# Patient Record
Sex: MALE | Race: WHITE | NOT HISPANIC OR LATINO | Employment: FULL TIME | URBAN - METROPOLITAN AREA
[De-identification: names, ages, dates, MRNs, and addresses within clinical notes are randomized per-mention and may not be internally consistent; named-entity substitution may affect disease eponyms.]

---

## 2024-12-02 ENCOUNTER — OFFICE VISIT (OUTPATIENT)
Dept: UROLOGY | Facility: HOSPITAL | Age: 34
End: 2024-12-02
Payer: COMMERCIAL

## 2024-12-02 DIAGNOSIS — Z31.69 INFERTILITY COUNSELING: Primary | ICD-10-CM

## 2024-12-02 PROCEDURE — 99203 OFFICE O/P NEW LOW 30 MIN: CPT | Performed by: UROLOGY

## 2024-12-02 PROCEDURE — 99213 OFFICE O/P EST LOW 20 MIN: CPT | Performed by: UROLOGY

## 2024-12-02 PROCEDURE — 1036F TOBACCO NON-USER: CPT | Performed by: UROLOGY

## 2024-12-02 ASSESSMENT — PATIENT HEALTH QUESTIONNAIRE - PHQ9
1. LITTLE INTEREST OR PLEASURE IN DOING THINGS: NOT AT ALL
SUM OF ALL RESPONSES TO PHQ9 QUESTIONS 1 AND 2: 0
2. FEELING DOWN, DEPRESSED OR HOPELESS: NOT AT ALL

## 2024-12-02 NOTE — PROGRESS NOTES
HPI:  34 y.o. yo male  referred to me by   self referred for infertility, wife seen another MD 2-3 weeks ago.    ED notes reviewed, [facility] med notes reviewed    Partner/wife name: Kamla Pritchett  Partner/wife  : 3/31/1990  Attempting Pregnancy for :  1 year  Previous pregnancies for either partner: no    Previous Semen analysis / Labs: no      PREVIOUS RISK FACTORS  History of testicular exposure to chemicals, radiation, or toxins: None    History of high fever, epididymitis, orchitis, prostatitis, sexually transmitted diseases, or trauma to the testicles: None    History of a varicocele, testicular torsion, cryptorchidism, postpubertile mumps or a family history of infertility: No    Coital Frequency: tracking ovulation and all the time  Coital Lubricants:   Problems with erections or ejaculation: No  Smoker: No  Previous Testosterone or anabolic steroid Use: No      PRIOR Assisted Reproductive Technology:  None      PMH:  No past medical history on file.     PSH:  No past surgical history on file.     Medications:  No current outpatient medications on file.    Allergy:  No Known Allergies     Exam  Testicles descended bilaterally, nontender, no masses  Vasa palpable bilaterally  Penis circ'd, no lesions, no plaques  Cyst in left epididymis    Assessment/Plan  #infertility  I counseled the patient in detail regarding infertility, specifically the male component. We reviewed different causes of male infertility as well as options to optimize male fertility, to different surgical interventions and assisted reproductive technologies.    Semen analysis with strict morphology x 2.      Fu after (can be virtual)    Scribe Attestation  By signing my name below, ISandrita , Scribe   attest that this documentation has been prepared under the direction and in the presence of Vicki Gibson MD.

## 2025-01-31 ENCOUNTER — ANCILLARY PROCEDURE (OUTPATIENT)
Dept: ENDOCRINOLOGY | Facility: CLINIC | Age: 35
End: 2025-01-31
Payer: COMMERCIAL

## 2025-01-31 ENCOUNTER — OFFICE VISIT (OUTPATIENT)
Dept: URGENT CARE | Age: 35
End: 2025-01-31

## 2025-01-31 VITALS
RESPIRATION RATE: 17 BRPM | TEMPERATURE: 98 F | OXYGEN SATURATION: 98 % | WEIGHT: 280 LBS | HEART RATE: 73 BPM | HEIGHT: 75 IN | BODY MASS INDEX: 34.82 KG/M2 | DIASTOLIC BLOOD PRESSURE: 83 MMHG | SYSTOLIC BLOOD PRESSURE: 137 MMHG

## 2025-01-31 DIAGNOSIS — N34.1 NONSPECIFIC URETHRITIS: Primary | ICD-10-CM

## 2025-01-31 DIAGNOSIS — Z31.69 INFERTILITY COUNSELING: ICD-10-CM

## 2025-01-31 LAB
% EX RESIDUAL CYTOPLASM (SEMEN): 0.3 %
% HEAD DEFECTS (SEMEN): 97.3 %
% NECK MIDPIECE (SEMEN): 47 %
% NORMAL (SEMEN): 1.8 % (ref 4–?)
% TAIL DEFECTS (SEMEN): 8.8 %
ABSTINENCE (DAYS): 3 DAYS (ref 2–7)
AGGLUTINATION (SEMEN): NO
ANALYZED TIME:: ABNORMAL
ANDROLOGY LAB ID#: ABNORMAL
CLUMPS (SEMEN): NO
COLLECTED COMPLETELY: YES
COLLECTION LOCATION:: ABNORMAL
COLLECTION METHOD:: ABNORMAL
CONCENTRATION(SEMEN): 41.37 MILL/ML (ref 15–?)
DEBRIS (SEMEN): YES
NON PROG. MOTILITY (SEMEN): 9 %
PROG. MOTILITY (SEMEN): 54 % (ref 32–?)
RECEIVED TIME:: ABNORMAL
SEMEN APPEARANCE: NORMAL
SEMEN LIQUEFACTION: NORMAL
SEMEN VISCOSITY: NORMAL
TOT. NO OF NORM. MOTILE SPERM (SEMEN): 2.29 MILL
TOT. NO OF NORM. SPERM (SEMEN): 3.62 MILL
TOTAL MOTILITY (SEMEN): 63 % (ref 40–?)
TOTAL NO OF MOTILE (SEMEN): 130.61 MILL
TOTAL NO OF SPERM (SEMEN): 206.84 MILL (ref 39–?)
VOLUME (SEMEN): 5 ML (ref 1.5–?)

## 2025-01-31 PROCEDURE — 89322 SEMEN ANAL STRICT CRITERIA: CPT | Performed by: OBSTETRICS & GYNECOLOGY

## 2025-01-31 RX ORDER — AZITHROMYCIN 250 MG/1
TABLET, FILM COATED ORAL
Qty: 6 TABLET | Refills: 0 | Status: SHIPPED | OUTPATIENT
Start: 2025-01-31 | End: 2025-02-05

## 2025-01-31 NOTE — PROGRESS NOTES
"Subjective   Patient ID: Uri Loera is a 34 y.o. male. They present today with a chief complaint of Other.    History of Present Illness  HPI  Patient presents with discussion of receiving azithromycin after his wife tested positive for Ureaplasma.  He states they are seeing an infertility expert who felt there may be a possibility it was contributing to their infertility.  Patient tested positive and was treated with azithromycin (followed by a course of doxycycline).  This patient is asymptomatic with no complaint of UTI symptoms or discharge.  He is feeling well and denies fevers, abdominal pain, cough or URI symptoms.  No testing or urine cultures are planned at this visit.  Past Medical History  Allergies as of 01/31/2025    (No Known Allergies)       (Not in a hospital admission)       No past medical history on file.    No past surgical history on file.     reports that he has never smoked. He has never been exposed to tobacco smoke. He has never used smokeless tobacco. He reports current alcohol use of about 2.0 standard drinks of alcohol per week. He reports that he does not use drugs.    Review of Systems  Review of Systems     As in history of present illness                          Objective    Vitals:    01/31/25 1422   BP: 137/83   Pulse: 73   Resp: 17   Temp: 36.7 °C (98 °F)   SpO2: 98%   Weight: 127 kg (280 lb)   Height: 1.905 m (6' 3\")     No LMP for male patient.    Physical Exam  Alert and oriented, no apparent distress  Abdomen soft, nontender, nondistended.  No CVA tenderness  Skin shows no rashes sores or lesions   exam not indicated at this time  Procedures    Point of Care Test & Imaging Results from this visit  No results found for this visit on 01/31/25.   No results found.    Diagnostic study results (if any) were reviewed by Tino Santiago MD.    Assessment/Plan   Allergies, medications, history, and pertinent labs/EKGs/Imaging reviewed by Tino Santiago MD.     Medical Decision " Making  At time of discharge patient was clinically well-appearing and HDS for outpatient management. The patient and/or family was educated regarding diagnosis, supportive care, OTC and Rx medications. The patient and/or family was given the opportunity to ask questions prior to discharge.  They verbalized understanding of my discussion of the plans for treatment, expected course, indications to return to  or seek further evaluation in ED, and the need for timely follow up as directed.   They were provided with a work/school excuse if requested.    Orders and Diagnoses  Diagnoses and all orders for this visit:  Nonspecific urethritis  -     azithromycin (Zithromax) 250 mg tablet; Take 2 tabs (500 mg) by mouth today, than 1 daily for 4 days.      Medical Admin Record      Patient disposition: Home    Electronically signed by Tino Santiago MD  2:41 PM

## 2025-02-26 ENCOUNTER — ANCILLARY PROCEDURE (OUTPATIENT)
Dept: ENDOCRINOLOGY | Facility: CLINIC | Age: 35
End: 2025-02-26
Payer: COMMERCIAL

## 2025-02-26 DIAGNOSIS — Z31.69 INFERTILITY COUNSELING: ICD-10-CM

## 2025-02-26 LAB
ABSTINENCE (DAYS): 2 DAYS (ref 2–7)
AGGLUTINATION (SEMEN): NO
ANALYZED TIME:: NORMAL
ANDROLOGY LAB ID#: NORMAL
CLUMPS (SEMEN): NO
COLLECTED COMPLETELY: YES
COLLECTION LOCATION:: NORMAL
COLLECTION METHOD:: NORMAL
CONCENTRATION(SEMEN): 51.79 MILL/ML (ref 15–?)
DEBRIS (SEMEN): YES
LEUKOCYTE (SEMEN): NORMAL
NON PROG. MOTILITY (SEMEN): 5 %
PROG. MOTILITY (SEMEN): 62 % (ref 32–?)
RECEIVED TIME:: NORMAL
REI PARTNER DOB: NORMAL
REI PARTNER NAME: NORMAL
SEMEN APPEARANCE: NORMAL
SEMEN LIQUEFACTION: NORMAL
SEMEN VISCOSITY: NORMAL
TOTAL MOTILITY (SEMEN): 67 % (ref 40–?)
TOTAL NO OF MOTILE (SEMEN): 142.41 MILL
TOTAL NO OF RND CELLS (SEMEN): 0.8 MILL (ref ?–5)
TOTAL NO OF SPERM (SEMEN): 212.36 MILL (ref 39–?)
VOLUME (SEMEN): 4.1 ML (ref 1.5–?)

## 2025-02-26 PROCEDURE — 89321 SEMEN ANAL SPERM DETECTION: CPT | Performed by: OBSTETRICS & GYNECOLOGY

## 2025-03-04 ENCOUNTER — CONSULT (OUTPATIENT)
Dept: ENDOCRINOLOGY | Facility: CLINIC | Age: 35
End: 2025-03-04
Payer: COMMERCIAL

## 2025-03-04 VITALS
DIASTOLIC BLOOD PRESSURE: 84 MMHG | HEART RATE: 79 BPM | HEIGHT: 75 IN | BODY MASS INDEX: 34.82 KG/M2 | SYSTOLIC BLOOD PRESSURE: 132 MMHG | TEMPERATURE: 98.1 F | WEIGHT: 280 LBS

## 2025-03-04 DIAGNOSIS — Z11.3 ENCOUNTER FOR SCREENING EXAMINATION FOR SEXUALLY TRANSMITTED DISEASE: ICD-10-CM

## 2025-03-04 DIAGNOSIS — Z31.41 FERTILITY TESTING: Primary | ICD-10-CM

## 2025-03-04 PROCEDURE — 99212 OFFICE O/P EST SF 10 MIN: CPT

## 2025-03-04 PROCEDURE — 99202 OFFICE O/P NEW SF 15 MIN: CPT

## 2025-03-04 ASSESSMENT — COLUMBIA-SUICIDE SEVERITY RATING SCALE - C-SSRS
6. HAVE YOU EVER DONE ANYTHING, STARTED TO DO ANYTHING, OR PREPARED TO DO ANYTHING TO END YOUR LIFE?: NO
2. HAVE YOU ACTUALLY HAD ANY THOUGHTS OF KILLING YOURSELF?: NO
1. IN THE PAST MONTH, HAVE YOU WISHED YOU WERE DEAD OR WISHED YOU COULD GO TO SLEEP AND NOT WAKE UP?: NO

## 2025-03-04 ASSESSMENT — PAIN SCALES - GENERAL: PAINLEVEL_OUTOF10: 0-NO PAIN

## 2025-03-04 ASSESSMENT — PATIENT HEALTH QUESTIONNAIRE - PHQ9
1. LITTLE INTEREST OR PLEASURE IN DOING THINGS: NOT AT ALL
2. FEELING DOWN, DEPRESSED OR HOPELESS: NOT AT ALL
SUM OF ALL RESPONSES TO PHQ9 QUESTIONS 1 AND 2: 0

## 2025-03-04 NOTE — PROGRESS NOTES
Visit Type: In Person  MD reviewed, Authorization status not noted.    Patient is the partner of Kamla PritchettMARCIA 3-, who presents to [facility] care to order fertility testing and treatment     PARTNER HISTORY  Partner Name:  katy  Partner :    Partner email:    Occupation:  self employed, insurance   Prior fertility history:  no  PMH:  no  PSH:  2010-left knee  Smoking: no  Alcohol Use:  social   Drug Use:  no  Medications:  no  Injuries:  no  STD:  no  SA:  yes, has seen DR. MUHAMMAD 2024 for evaluation- 2 SA normal:  SA Results:                                             25  Volume (Semen)  >=1.5 mL 4.10 5.00   Concentration(Semen)  >=15 mill/mL 51.79 41.37   Total Motility (Semen)  >=40 % 67 63   Prog. Motility (Semen)  >=32 % 62 54   Non Prog. Motility (Semen)  % 5 9   Total No of Sperm (Semen)  >=39 mill 212.36 206.84   Total No of Motile (Semen)  mill 142.41 130.61   Total No of Rnd Cells (Semen)  <=5 mill 0.8    Leukocyte (Semen) NOT PERFORMED    Abstinence (days)  2 - 7 days 2 3       Intimate Exam Performed: No, an intimate exam was not performed at this encounter.     Plan:  STDs  Myriad if partner is a carrier    Follow up Plan:  6-8 week virtual visit with partner to review test results and treatment options    Renee Red CNP 25 2:48 PM

## 2025-03-13 LAB
C TRACH RRNA SPEC QL NAA+PROBE: NOT DETECTED
HBV SURFACE AG SERPL QL IA: NORMAL
HCV AB SERPL QL IA: NORMAL
HIV 1+2 AB+HIV1 P24 AG SERPL QL IA: NORMAL
N GONORRHOEA RRNA SPEC QL NAA+PROBE: NOT DETECTED
QUEST GC CT AMPLIFIED (ALWAYS MESSAGE): NORMAL
T PALLIDUM AB SER QL IA: NEGATIVE

## 2025-03-18 DIAGNOSIS — Z13.71 SCREENING FOR GENETIC DISEASE CARRIER STATUS: Primary | ICD-10-CM

## 2025-04-25 DIAGNOSIS — Z13.71 GENETIC DISEASE CARRIER STATUS TESTING, MALE: Primary | ICD-10-CM

## 2025-04-29 ENCOUNTER — LAB (OUTPATIENT)
Dept: LAB | Facility: HOSPITAL | Age: 35
End: 2025-04-29
Payer: COMMERCIAL

## 2025-04-29 ENCOUNTER — APPOINTMENT (OUTPATIENT)
Dept: ENDOCRINOLOGY | Facility: CLINIC | Age: 35
End: 2025-04-29
Payer: COMMERCIAL

## 2025-04-29 DIAGNOSIS — Z13.71 ENCOUNTER FOR NONPROCREATIVE SCREENING FOR GENETIC DISEASE CARRIER STATUS: Primary | ICD-10-CM

## 2025-05-19 NOTE — PROGRESS NOTES
HPI:  34 y.o. yo male  referred to me by   self referred for infertility, wife seen another MD 2-3 weeks ago.    Last Visit: 24  #infertility  Semen analysis with strict morphology x 2.    Today's Visit:  Urgent care and [facility] note reviewed    Partner/wife name: Kamla Pritchett  Partner/wife  : 3/31/1990  Attempting Pregnancy for :  1 year  Previous pregnancies for either partner: no    PREVIOUS RISK FACTORS: no    Coital Frequency: tracking ovulation and all the time  Problems with erections or ejaculation: No  Smoker: No  Previous Testosterone or anabolic steroid Use: No      PRIOR Assisted Reproductive Technology:  None    Component      Latest Ref Rng 3/11/2025   HEPATITIS B SURFACE ANTIGEN      NON-REACTIVE  NON-REACTIVE    HEPATITIS C ANTIBODY      NON-REACTIVE  NON-REACTIVE    HIV AG/AB, 4TH GEN      NON-REACTIVE  NON-REACTIVE    T. PALLIDUM AB      Negative  Negative          Component      Latest Ref Rng 2025   Semen Appearance Normal  Normal    Semen Viscosity Normal  Normal    Semen Liquefaction Normal  Normal    Debris (Semen) Yes  Yes    Clumps (Semen) No  No    Agglutination (Semen) No  No    Volume (Semen)      >=1.5 mL 5.00  4.10    Concentration(Semen)      >=15 mill/mL 41.37  51.79    Total Motility (Semen)      >=40 % 63  67    Prog. Motility (Semen)      >=32 % 54  62    Non Prog. Motility (Semen)      % 9  5    Total No of Sperm (Semen)      >=39 mill 206.84  212.36    Total No of Motile (Semen)      mill 130.61  142.41        PMH:  No past medical history on file.     PSH:  No past surgical history on file.     Medications:  No current outpatient medications on file.    Allergy:  No Known Allergies     Exam  CONSTITUTIONAL:        No acute distress    HEAD:        Normocephalic and atraumatic    CHEST / RESPIRATORY      no excess work of breathing, no respiratory distress,    ABDOMEN / GASTROINTESTINAL:        Abdomen nondistended        Assessment/Plan  #infertility  I  counseled the patient in detail regarding infertility, specifically the male component. We reviewed different causes of male infertility as well as options to optimize male fertility, to different surgical interventions and assisted reproductive technologies.  -patient inquired about clomid- discussed risks, benefits, alternatives  -given normal sperm counts, would not recommend clomid at this time      Scribe Attestation  By signing my name below, MIKAYLA Sandrita [address] , Scribe   attest that this documentation has been prepared under the direction and in the presence of Vicki Gibson MD.     Attending Attestation (For Attendings USE Only)...

## 2025-05-20 ENCOUNTER — APPOINTMENT (OUTPATIENT)
Dept: UROLOGY | Facility: CLINIC | Age: 35
End: 2025-05-20
Payer: COMMERCIAL

## 2025-05-20 VITALS — TEMPERATURE: 97.3 F

## 2025-05-20 DIAGNOSIS — Z31.41 ENCOUNTER FOR FERTILITY TESTING: Primary | ICD-10-CM

## 2025-05-20 LAB
COMMENTS - MP RESULT TYPE: NORMAL
SCAN RESULT: NORMAL

## 2025-05-20 PROCEDURE — 1036F TOBACCO NON-USER: CPT | Performed by: UROLOGY

## 2025-05-20 PROCEDURE — 99214 OFFICE O/P EST MOD 30 MIN: CPT | Performed by: UROLOGY

## 2025-05-20 ASSESSMENT — PAIN SCALES - GENERAL: PAINLEVEL_OUTOF10: 0-NO PAIN

## 2025-06-09 DIAGNOSIS — Z31.41 FERTILITY TESTING: ICD-10-CM
